# Patient Record
Sex: FEMALE | Race: OTHER
[De-identification: names, ages, dates, MRNs, and addresses within clinical notes are randomized per-mention and may not be internally consistent; named-entity substitution may affect disease eponyms.]

---

## 2021-04-02 ENCOUNTER — HOSPITAL ENCOUNTER (OUTPATIENT)
Dept: HOSPITAL 61 - CT | Age: 46
End: 2021-04-02
Attending: PHYSICIAN ASSISTANT
Payer: COMMERCIAL

## 2021-04-02 DIAGNOSIS — R51.9: Primary | ICD-10-CM

## 2021-04-02 PROCEDURE — 70450 CT HEAD/BRAIN W/O DYE: CPT

## 2021-04-02 NOTE — RAD
CT HEAD/BRAIN WO



History: Reason: INCREASING FREQUENCY OF HA'S / Spl. Instructions:  / History: 



Comparison: None.



Technique: Noncontrast CT imaging was performed of the head.  



Exposure: One or more of the following individualized dose reduction techniques were utilized for thi
s examination:  

1. Automated exposure control  

2. Adjustment of the mA and/or kV according to patient size  

3. Use of iterative reconstruction technique.



Findings: 

No intracranial hemorrhage. No mass effect. No hydrocephalus.  



Foci of decreased attenuation within the hemispheric white matter most prominent within the right par
ietal white matter.



Imaged orbits are unremarkable. Imaged paranasal sinuses and mastoid air cells are clear. No acute ca
lvarial fracture.



Impression:

1.  No acute intracranial abnormality.

2.  Mild nonspecific white matter changes most prominent within the right parietal white matter. Diff
erential considerations include migraine headaches, prior insult, sequela chronic microvascular ische
marty, demyelinating disease or vasculitis. MRI with and without contrast can further assess.



Electronically signed by: Yogesh Carter DO (4/2/2021 9:21 AM) [id number]

## 2021-07-29 ENCOUNTER — HOSPITAL ENCOUNTER (OUTPATIENT)
Dept: HOSPITAL 61 - MRI | Age: 46
End: 2021-07-29
Attending: PHYSICIAN ASSISTANT
Payer: COMMERCIAL

## 2021-07-29 DIAGNOSIS — R93.0: ICD-10-CM

## 2021-07-29 DIAGNOSIS — G93.89: Primary | ICD-10-CM

## 2021-07-29 PROCEDURE — 70553 MRI BRAIN STEM W/O & W/DYE: CPT

## 2024-02-06 NOTE — RAD
EXAMINATION: Magnetic resonance imaging (MRI) of the brain and brainstem without and with contrast 7/
29/2021 11:00 AM



HISTORY: Migraines



TECHNIQUE: Multiplanar multi-weighted MRI of the brain and brainstem was performed without and with i
ntravenous contrast using the general brain protocol.



Contrast information:

15 mL Gadolinium based contrast



COMPARISON: CT head 221.



FINDINGS:



The scalp and calvarium are normal. The superior sagittal sinus demonstrates normal venous flow.  The
 corpus callosum is normal in shape and signal intensity. The posterior fossa is unremarkable.  The p
ituitary and sella are normal.  The brainstem and craniocervical junction are unremarkable. There are
 T2/FLAIR signal hyperintense foci in the periventricular and subcortical white matter most. Focal ar
ea of subcortical T2 signal hyperintensity along the right parietal lobe (series 6, image 15) corresp
onds with focal hypoattenuation identified on prior CT. There is FLAIR signal alteration suggestive o
f gliosis. No suspicious enhancement is visualized.





Diffusion weighted images reveal no hyperintensities to suggest acute cerebral infarction. The suscep
tibility weighted sequences reveal no evidence of acute or chronic hemorrhage. The ventricles are nor
mal in size and position without evidence of hydrocephalus.



There are no areas of abnormal contrast enhancement.



The paranasal sinuses are normal.  The visualized portions of the mastoids are unremarkable. The orbi
ts appear normal.  Normal flow voids are demonstrated in the carotid arteries and basilar artery.



IMPRESSION:



Few scattered foci of FLAIR/T2 signal hyperintensity identified within the periventricular and subcor
tical white matter. There is a focal area of subcortical wedge-shaped T2 signal hyperintensity with a
ssociated gliosis. Differential considerations would include sequela of remote ischemic changes versu
s dilated perivascular space versus focal area of demyelination. No associated enhancement is identif
ied. Consideration may be given for repeat examination in 3-6 months to assess stability. Findings ar
e atypical for malignancy. In the absence of underlying seizure disorder, findings are unlikely of cl
inical significance.



Electronically signed by: Ann Calderon MD (7/29/2021 1:13 PM) [id number]
(+) left knee pain